# Patient Record
Sex: FEMALE | Race: WHITE | NOT HISPANIC OR LATINO | Employment: STUDENT
[De-identification: names, ages, dates, MRNs, and addresses within clinical notes are randomized per-mention and may not be internally consistent; named-entity substitution may affect disease eponyms.]

---
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
## 2024-09-10 ENCOUNTER — MEDICAL CORRESPONDENCE (OUTPATIENT)
Dept: HEALTH INFORMATION MANAGEMENT | Facility: CLINIC | Age: 19
End: 2024-09-10

## 2024-09-10 ENCOUNTER — TRANSFERRED RECORDS (OUTPATIENT)
Dept: HEALTH INFORMATION MANAGEMENT | Facility: CLINIC | Age: 19
End: 2024-09-10

## 2024-09-12 ENCOUNTER — MEDICAL CORRESPONDENCE (OUTPATIENT)
Dept: HEALTH INFORMATION MANAGEMENT | Facility: CLINIC | Age: 19
End: 2024-09-12

## 2024-09-12 ENCOUNTER — TRANSCRIBE ORDERS (OUTPATIENT)
Dept: OTHER | Age: 19
End: 2024-09-12

## 2024-09-12 DIAGNOSIS — G93.5 CHIARI MALFORMATION TYPE I (H): Primary | ICD-10-CM

## 2024-09-12 DIAGNOSIS — M54.14 THORACIC RADICULOPATHY: ICD-10-CM

## 2024-09-12 DIAGNOSIS — G89.29 CHRONIC PAIN: ICD-10-CM

## 2024-09-13 ENCOUNTER — TRANSCRIBE ORDERS (OUTPATIENT)
Dept: UROLOGY | Facility: CLINIC | Age: 19
End: 2024-09-13

## 2024-09-13 ENCOUNTER — TELEPHONE (OUTPATIENT)
Dept: UROLOGY | Facility: CLINIC | Age: 19
End: 2024-09-13
Payer: COMMERCIAL

## 2024-09-13 NOTE — TELEPHONE ENCOUNTER
Health Call Center    Phone Message    May a detailed message be left on voicemail: yes     Reason for Call: Appointment Intake/ Referral received  Referring Provider Name: Dr Sonny Rowe @ [facility]  Diagnosis and/or Symptoms: Chiari malformation type I (H) [G93.5] Thoracic radiculopathy [M54.14] Chronic pain [G89.29]   My Clinical Question Is: 2nd opinion- possible Neurogenic bladder    Patient also mentioned that Dr. Rowe was emailing with Dr. Keene as well.    Writer routing to clinic to triage and assist patient. Specifically referred to Dr. Keene. No upcoming availability/template with visit type New Neurogenic Bladder.     Action Taken: Message routed to:  [facility]    Travel Screening: Not Applicable     Date of Service:             No need to reply to sender of message

## 2024-09-25 ENCOUNTER — PRE VISIT (OUTPATIENT)
Dept: UROLOGY | Facility: CLINIC | Age: 19
End: 2024-09-25
Payer: COMMERCIAL

## 2024-09-25 NOTE — TELEPHONE ENCOUNTER
MEDICAL RECORDS REQUEST   [facility]  Urology Clinic  [address] [place] [address]  PHONE: [phone]  Fax: [phone]        FUTURE VISIT INFORMATION                                                   Lydia Barroso, : 2005 scheduled for future visit at [facility]    APPOINTMENT INFORMATION:  Date: 10/03/2024  Provider:  Charity Keene MD  Reason for Visit/Diagnosis: neurogenic bladder    REFERRAL INFORMATION:  Referring provider:  Dr Sonny Rowe @ [facility]      RECORDS REQUESTED FOR VISIT                                                     NOTES  STATUS/DETAILS   OFFICE NOTE from referring provider Media tab Yes, 09/10/2024 -- Sonny [facility]e MD [facility]   OFFICE NOTE from other specialist  yes, 2024 -- Néstor Caputo MD @ [facility]     2024 -- Elli Agarwal PA-C @ [facility]  more   MEDICATION LIST  yes   NEUROGENIC BLADDER     IMAGES PACS yes, ESSENTIA  10/23/2019 -- US ABD   LABS (CMP, RENAL PANEL, CBC)  yes     PRE-VISIT CHECKLIST      Joint diagnostic appointment coordinated correctly          (ensure right order & amount of time) Yes   RECORD COLLECTION COMPLETE Yes

## 2024-09-25 NOTE — TELEPHONE ENCOUNTER
Reason for visit: New neurogenic bladder     Relevant information: 2nd opinion possible neurogenic bladder. Last seen by [facility] urology. History of Chiari I malformation.     Records/imaging/labs/orders: All records available    Pt called: No need for a call    At Rooming: Have patient empty their bladder and PVR. Get a urine sample and dip the urine if they have UTI symptoms.    Radhika Motley  9/25/2024  10:58 AM  
Authored by Resident/PA/NP

## 2024-10-03 ENCOUNTER — OFFICE VISIT (OUTPATIENT)
Dept: UROLOGY | Facility: CLINIC | Age: 19
End: 2024-10-03
Payer: COMMERCIAL

## 2024-10-03 ENCOUNTER — ANCILLARY PROCEDURE (OUTPATIENT)
Dept: ULTRASOUND IMAGING | Facility: CLINIC | Age: 19
End: 2024-10-03
Attending: UROLOGY
Payer: COMMERCIAL

## 2024-10-03 ENCOUNTER — PRE VISIT (OUTPATIENT)
Dept: UROLOGY | Facility: CLINIC | Age: 19
End: 2024-10-03

## 2024-10-03 VITALS
DIASTOLIC BLOOD PRESSURE: 73 MMHG | WEIGHT: 152 LBS | OXYGEN SATURATION: 100 % | SYSTOLIC BLOOD PRESSURE: 119 MMHG | HEIGHT: 69 IN | HEART RATE: 65 BPM | BODY MASS INDEX: 22.51 KG/M2

## 2024-10-03 DIAGNOSIS — N31.9 NEUROGENIC BLADDER: ICD-10-CM

## 2024-10-03 DIAGNOSIS — N39.8 VOIDING DYSFUNCTION: ICD-10-CM

## 2024-10-03 DIAGNOSIS — G89.29 OTHER CHRONIC PAIN: ICD-10-CM

## 2024-10-03 DIAGNOSIS — G93.5 CHIARI MALFORMATION TYPE I (H): ICD-10-CM

## 2024-10-03 DIAGNOSIS — M54.14 THORACIC RADICULOPATHY: ICD-10-CM

## 2024-10-03 DIAGNOSIS — N39.8 VOIDING DYSFUNCTION: Primary | ICD-10-CM

## 2024-10-03 PROCEDURE — 99204 OFFICE O/P NEW MOD 45 MIN: CPT | Mod: 25 | Performed by: UROLOGY

## 2024-10-03 PROCEDURE — 51798 US URINE CAPACITY MEASURE: CPT | Performed by: UROLOGY

## 2024-10-03 PROCEDURE — 76770 US EXAM ABDO BACK WALL COMP: CPT | Mod: GC | Performed by: RADIOLOGY

## 2024-10-03 RX ORDER — HYDROXYZINE HYDROCHLORIDE 25 MG/1
25 TABLET, FILM COATED ORAL
COMMUNITY
Start: 2022-12-19

## 2024-10-03 RX ORDER — ESCITALOPRAM OXALATE 20 MG/1
20 TABLET ORAL DAILY
COMMUNITY

## 2024-10-03 RX ORDER — TRETINOIN 0.5 MG/G
CREAM TOPICAL AT BEDTIME
COMMUNITY
Start: 2024-06-12

## 2024-10-03 RX ORDER — AZELAIC ACID 0.15 G/G
GEL TOPICAL 2 TIMES DAILY
COMMUNITY
Start: 2024-01-02

## 2024-10-03 RX ORDER — ETONOGESTREL AND ETHINYL ESTRADIOL .015; .12 MG/D; MG/D
1 INSERT, EXTENDED RELEASE VAGINAL
COMMUNITY
Start: 2024-08-16

## 2024-10-03 ASSESSMENT — PAIN SCALES - GENERAL: PAINLEVEL: NO PAIN (0)

## 2024-10-03 NOTE — PROGRESS NOTES
October 3, 2024    Referring Provider: Sonny Rowe MD  [facility]  [address]  [place] [address]    Primary Care Provider: Derik Sutherland    Assessment & Plan     Chiari malformation type I (H)/Thoracic radiculopathy  She follows with neurology for this and is stable after her surgery  - Adult Urology  Referral  - US Renal Complete Non-Vascular; Future    Other chronic pain  She is working with Dr Koo for her pain and lack of sensation  - Adult Urology  Referral  - US Renal Complete Non-Vascular; Future    Voiding dysfunction  Discussed that I would recommend continued timed voiding at this time.  Unclear the etiology for her symptoms  - US Renal Complete Non-Vascular; Future  - Routine UA with microscopic - No culture; Future  - Urine Culture Aerobic Bacterial; Future    Neurogenic bladder  Discussed that this is a term that just denotes her urinary symptoms are likely contributed by her prior neurologic issues.  Renal US ordered to ensure no hydronephrosis and I do not see any recent renal US  - US Renal Complete Non-Vascular; Future  - Routine UA with microscopic - No culture; Future  - Urine Culture Aerobic Bacterial; Future    We had a discussion that at this time there is no way to figure out why her bladder is this way, we can consider her prior neurologic issues play a role but no way to prove it.  Discussed at this time I am reassured that she is not having gross hematuria, UTIs or pain.      I have recommended some testing to assess that her bladder is safe (renal US to evaluate for hydro) and VUDS to assess bladder function and compliance at this time.  Given that she has altered sensation I would also like her to do a UA/UC about a week before her VUDS to evaluate for any bacteriuria    30 minutes were spent on this day of the encounter in reviewing the EMR including reviewing Dr Rowe' note and Dr Buchanan's note,  direct patient care including ordering renal US, coordination of care including discussion with referring MD, and documentation    Charity Keene MD MPH  (she/her/hers)   of Urology  [facility]      HPI:  Lydia Barroso is a 19 year old female who presents for evaluation of her pelvic floor symptoms.  She is here today with her mother.  Patient is referred here by Dr Rowe who is one of her physicians at [facility], note from 9/16/24 reviewed in [facility].  Patient has a history of Chiari formation with syrinx noted after she was having some peripheral nerve issues with using her extremities.  She underwent successful surgery for this in 2019.  Since then she has had altered sensation for many things.  She follows with neurology as well.  She is unsure when her urinary issues started but started noting that she was urinating less frequently and things were different earlier this year, underwent both brain and spine MRIs with her neurologist which she reports were unremarkable.      She states that on a good day that she urinate 3x on a bad 1x day, just doesn't necessarily feel any urgency.  Mother notes however patient can have really bad menstrual cramps.    Saw Dr Néstor Buchanan in [place] 9/9/24, note reviewed in [facility].  He told her to do timed voiding.  She and her mother are here today for another opinion    Patient denies gross hematuria, UTIs, urinary incontinence.  She has not had any issues with urinary retention    No past medical history on file.    No past surgical history on file.    Social History     Socioeconomic History    Marital status: Single     Spouse name: Not on file    Number of children: Not on file    Years of education: Not on file    Highest education level: Not on file   Occupational History    Not on file   Tobacco Use    Smoking status: Never    Smokeless tobacco: Not on file   Substance and Sexual Activity    Alcohol use: Not on file     Drug use: Not on file    Sexual activity: Not on file   Other Topics Concern    Not on file   Social History Narrative    The patient lives with Mother, Father, and 2 sisters    School Name: [employer]    Grade: Second (2012)             Social Determinants of Health     Financial Resource Strain: Low Risk  (2/5/2024)    Received from [employer] [facility] [employer] [facility]    Overall Financial Resource Strain (CARDIA)     Difficulty of Paying Living Expenses: Not hard at all   Food Insecurity: No Food Insecurity (3/5/2024)    [facility] [employer] [facility] [employer] [facility]    Hunger Vital Sign     Worried About Running Out of Food in the Last Year: Never true     Ran Out of Food in the Last Year: Never true   Transportation Needs: No Transportation Needs (3/5/2024)    [facility] [employer] [facility] [employer] [facility]    PRAPARE - Transportation     Lack of Transportation (Medical): No     Lack of Transportation (Non-Medical): No   Physical Activity: Sufficiently Active (2/5/2024)    Received from [employer] [facility] [employer] [facility]    Exercise Vital Sign     Days of Exercise per Week: 6 days     Minutes of Exercise per Session: 60 min   Stress: No Stress Concern Present (2/5/2024)    Received from [employer] [facility]    [place] of Occupational Health - Occupational Stress Questionnaire     Feeling of Stress : Not at all   Social Connections: Unknown (2/5/2024)    Received from [employer] [facility]    Social Connection and Isolation Panel [NHANES]     Frequency of Communication with Friends and Family: More than three times a week     Frequency of Social Gatherings with Friends and Family: More than three times a week     Attends Taoism Services: Never     Active Member of Clubs or Organizations: No     Attends Club or Organization Meetings: Never     Marital Status: Patient declined   Interpersonal  "Safety: Patient Declined (2/5/2024)    Received from [facility]    Humiliation, Afraid, Rape, and Kick questionnaire     Fear of Current or Ex-Partner: Patient declined     Emotionally Abused: Patient declined     Physically Abused: Patient declined     Sexually Abused: Patient declined   Housing Stability: Low Risk  (3/5/2024)    Received from [facility]     IP Housing Domain     Retired - What is your living situation today? : I have a steady place to live     Family History   Problem Relation Age of Onset    Diabetes No family hx of     Asthma No family hx of     Hypertension No family hx of     Thyroid Disease No family hx of      ROS    Allergies   Allergen Reactions    No Known Drug Allergy      Current Outpatient Medications   Medication Sig Dispense Refill    azelaic acid (FINACIA) 15 % external gel Apply topically 2 times daily.      HALOETTE 0.12-0.015 MG/24HR vaginal ring Place 1 each vaginally every 28 days.      hydrOXYzine HCl (ATARAX) 25 MG tablet Take 25 mg by mouth.      tretinoin (RETIN-A) 0.05 % external cream Apply topically at bedtime.      amoxicillin-clavulanate (AUGMENTIN) 875-125 MG per tablet Take 1 tablet by mouth 2 times daily 20 tablet 0    escitalopram (LEXAPRO) 20 MG tablet Take 20 mg by mouth daily.       No current facility-administered medications for this visit.     /73   Pulse 65   Ht 1.753 m (5' 9\")   Wt 68.9 kg (152 lb)   SpO2 100%   BMI 22.45 kg/m    GENERAL: healthy, alert and no distress  EYES: Eyes grossly normal to inspection, conjunctivae and sclerae normal  HENT: normal cephalic/atraumatic.  External ears, nose and mouth without ulcers or lesions.  RESP: no audible wheeze, cough, or visible cyanosis.  No visible retractions or increased work of breathing.  Able to speak fully in complete sentences.  NEURO: Cranial nerves grossly intact, mentation intact and speech normal  PSYCH: mentation " appears normal, affect normal/bright, judgement and insight intact, normal speech and appearance well-groomed    PVR 0 mL by bladder scan    CC  Patient Care Team:  Derik Sutherland as PCP - General (Family Medicine)  FRANCIS BARON

## 2024-10-03 NOTE — NURSING NOTE
"Chief Complaint   Patient presents with    Neurogenic Bladder       Blood pressure 119/73, pulse 65, height 1.753 m (5' 9\"), weight 68.9 kg (152 lb), SpO2 100%. Body mass index is 22.45 kg/m .    There is no problem list on file for this patient.      Allergies   Allergen Reactions    No Known Drug Allergy        Current Outpatient Medications   Medication Sig Dispense Refill    azelaic acid (FINACIA) 15 % external gel Apply topically 2 times daily.      HALOETTE 0.12-0.015 MG/24HR vaginal ring Place 1 each vaginally every 28 days.      hydrOXYzine HCl (ATARAX) 25 MG tablet Take 25 mg by mouth.      tretinoin (RETIN-A) 0.05 % external cream Apply topically at bedtime.      amoxicillin-clavulanate (AUGMENTIN) 875-125 MG per tablet Take 1 tablet by mouth 2 times daily 20 tablet 0    escitalopram (LEXAPRO) 20 MG tablet Take 20 mg by mouth daily.         Social History     Tobacco Use    Smoking status: Haider Motley  10/3/2024  1:23 PM     "

## 2024-10-03 NOTE — LETTER
10/3/2024       RE: Lydia Barroso  [address]  [place] [place] [facility] [address]     Dear Colleague,    Thank you for referring your patient, Lydia Barroso, to the [facility] [place] [facility]. Please see a copy of my visit note below.    October 3, 2024    Referring Provider: Sonny Rowe MD  [facility] PAIN PALLIATIVE [facility]  [address]  [place] [address]    Primary Care Provider: Derik Sutherland    Assessment & Plan    Chiari malformation type I (H)/Thoracic radiculopathy  She follows with neurology for this and is stable after her surgery  - Adult Urology  Referral  - US Renal Complete Non-Vascular; Future    Other chronic pain  She is working with Dr Koo for her pain and lack of sensation  - Adult Urology  Referral  - US Renal Complete Non-Vascular; Future    Voiding dysfunction  Discussed that I would recommend continued timed voiding at this time.  Unclear the etiology for her symptoms  - US Renal Complete Non-Vascular; Future  - Routine UA with microscopic - No culture; Future  - Urine Culture Aerobic Bacterial; Future    Neurogenic bladder  Discussed that this is a term that just denotes her urinary symptoms are likely contributed by her prior neurologic issues.  Renal US ordered to ensure no hydronephrosis and I do not see any recent renal US  - US Renal Complete Non-Vascular; Future  - Routine UA with microscopic - No culture; Future  - Urine Culture Aerobic Bacterial; Future    We had a discussion that at this time there is no way to figure out why her bladder is this way, we can consider her prior neurologic issues play a role but no way to prove it.  Discussed at this time I am reassured that she is not having gross hematuria, UTIs or pain.      I have recommended some testing to assess that her bladder is safe (renal US to evaluate for hydro) and VUDS to assess bladder  function and compliance at this time.  Given that she has altered sensation I would also like her to do a UA/UC about a week before her VUDS to evaluate for any bacteriuria    30 minutes were spent on this day of the encounter in reviewing the EMR including reviewing Dr Rowe' note and Dr Buchanan's note, direct patient care including ordering renal US, coordination of care including discussion with referring MD, and documentation    Charity Keene MD MPH  (she/her/hers)   of Urology  [facility]      HPI:  Lydia Barroso is a 19 year old female who presents for evaluation of her pelvic floor symptoms.  She is here today with her mother.  Patient is referred here by Dr Rowe who is one of her physicians at [facility], note from 9/16/24 reviewed in [facility].  Patient has a history of Chiari formation with syrinx noted after she was having some peripheral nerve issues with using her extremities.  She underwent successful surgery for this in 2019.  Since then she has had altered sensation for many things.  She follows with neurology as well.  She is unsure when her urinary issues started but started noting that she was urinating less frequently and things were different earlier this year, underwent both brain and spine MRIs with her neurologist which she reports were unremarkable.      She states that on a good day that she urinate 3x on a bad 1x day, just doesn't necessarily feel any urgency.  Mother notes however patient can have really bad menstrual cramps.    Saw Dr Néstor Buchanan in [place] 9/9/24, note reviewed in [facility].  He told her to do timed voiding.  She and her mother are here today for another opinion    Patient denies gross hematuria, UTIs, urinary incontinence.  She has not had any issues with urinary retention    No past medical history on file.    No past surgical history on file.    Social History     Socioeconomic History     Marital status: Single      Spouse name: Not on file     Number of children: Not on file     Years of education: Not on file     Highest education level: Not on file   Occupational History     Not on file   Tobacco Use     Smoking status: Never     Smokeless tobacco: Not on file   Substance and Sexual Activity     Alcohol use: Not on file     Drug use: Not on file     Sexual activity: Not on file   Other Topics Concern     Not on file   Social History Narrative    The patient lives with Mother, Father, and 2 sisters    School Name: [employer]    Grade: Second (2012)             Social Determinants of Health     Financial Resource Strain: Low Risk  (2/5/2024)    Received from [employer] [facility]    Overall Financial Resource Strain (CARDIA)      Difficulty of Paying Living Expenses: Not hard at all   Food Insecurity: No Food Insecurity (3/5/2024)    Received from [employer] [facility]    Hunger Vital Sign      Worried About Running Out of Food in the Last Year: Never true      Ran Out of Food in the Last Year: Never true   Transportation Needs: No Transportation Needs (3/5/2024)    Received from [employer] [facility]    PRAPARE - Transportation      Lack of Transportation (Medical): No      Lack of Transportation (Non-Medical): No   Physical Activity: Sufficiently Active (2/5/2024)    Received from [employer] [facility]    Exercise Vital Sign      Days of Exercise per Week: 6 days      Minutes of Exercise per Session: 60 min   Stress: No Stress Concern Present (2/5/2024)    Received from [employer] [facility]    [place] of Occupational Health - Occupational Stress Questionnaire      Feeling of Stress : Not at all   Social Connections: Unknown (2/5/2024)    Received from [employer] [facility]    Social Connection and Isolation Panel [NHANES]      Frequency of Communication with  "Friends and Family: More than three times a week      Frequency of Social Gatherings with Friends and Family: More than three times a week      Attends Advent Services: Never      Active Member of Clubs or Organizations: No      Attends Club or Organization Meetings: Never      Marital Status: Patient declined   Interpersonal Safety: Patient Declined (2/5/2024)    Received from [facility]    Humiliation, Afraid, Rape, and Kick questionnaire      Fear of Current or Ex-Partner: Patient declined      Emotionally Abused: Patient declined      Physically Abused: Patient declined      Sexually Abused: Patient declined   Housing Stability: Low Risk  (3/5/2024)    Received from [facility] [employer] [facility] IP Housing Domain      Retired - What is your living situation today? : I have a steady place to live     Family History   Problem Relation Age of Onset     Diabetes No family hx of      Asthma No family hx of      Hypertension No family hx of      Thyroid Disease No family hx of      ROS    Allergies   Allergen Reactions     No Known Drug Allergy      Current Outpatient Medications   Medication Sig Dispense Refill     azelaic acid (FINACIA) 15 % external gel Apply topically 2 times daily.       HALOETTE 0.12-0.015 MG/24HR vaginal ring Place 1 each vaginally every 28 days.       hydrOXYzine HCl (ATARAX) 25 MG tablet Take 25 mg by mouth.       tretinoin (RETIN-A) 0.05 % external cream Apply topically at bedtime.       amoxicillin-clavulanate (AUGMENTIN) 875-125 MG per tablet Take 1 tablet by mouth 2 times daily 20 tablet 0     escitalopram (LEXAPRO) 20 MG tablet Take 20 mg by mouth daily.       No current facility-administered medications for this visit.     /73   Pulse 65   Ht 1.753 m (5' 9\")   Wt 68.9 kg (152 lb)   SpO2 100%   BMI 22.45 kg/m    GENERAL: healthy, alert and no distress  EYES: Eyes grossly normal to inspection, conjunctivae and " sclerae normal  HENT: normal cephalic/atraumatic.  External ears, nose and mouth without ulcers or lesions.  RESP: no audible wheeze, cough, or visible cyanosis.  No visible retractions or increased work of breathing.  Able to speak fully in complete sentences.  NEURO: Cranial nerves grossly intact, mentation intact and speech normal  PSYCH: mentation appears normal, affect normal/bright, judgement and insight intact, normal speech and appearance well-groomed    PVR 0 mL by bladder scan    CC  Patient Care Team:  Derik Sutherland as PCP - General (Family Medicine)  FRANCIS BARON                Again, thank you for allowing me to participate in the care of your patient.      Sincerely,    Charity Keene MD

## 2024-10-03 NOTE — PATIENT INSTRUCTIONS
Please schedule a lab only appointment to collect a UA/UC 7-10 days prior to urodynamics appointment.      Websites with free information:    American Urogynec[employer] Society patient website: www.voicesforpfd.org    Total Control Program: www.totalcontrolprogram.com    It was a pleasure meeting with you today.  Thank you for allowing me and my team the privilege of caring for you today.  YOU are the reason we are here, and I truly hope we provided you with the excellent service you deserve.  Please let us know if there is anything else we can do for you so that we can be sure you are leaving completely satisfied with your care experience.

## 2024-10-09 ENCOUNTER — TRANSFERRED RECORDS (OUTPATIENT)
Dept: MULTI SPECIALTY CLINIC | Facility: CLINIC | Age: 19
End: 2024-10-09

## 2024-10-09 LAB — CHLAMYDIA - HIM PATIENT REPORTED: NORMAL

## 2024-10-15 ENCOUNTER — PRE VISIT (OUTPATIENT)
Dept: UROLOGY | Facility: CLINIC | Age: 19
End: 2024-10-15
Payer: COMMERCIAL

## 2024-10-15 NOTE — TELEPHONE ENCOUNTER
Reason for visit: VUDS    Study scheduled because: evaluation for bacteriuria    Relevant information:  voiding dysfunction, neurogenic bladder       Records/imaging/labs/orders: all records available    UA/UC ordered: kareem Weinberg  10/15/2024  8:07 AM

## 2024-10-21 ENCOUNTER — LAB (OUTPATIENT)
Dept: LAB | Facility: CLINIC | Age: 19
End: 2024-10-21
Payer: COMMERCIAL

## 2024-10-21 DIAGNOSIS — N39.8 VOIDING DYSFUNCTION: ICD-10-CM

## 2024-10-21 DIAGNOSIS — N31.9 NEUROGENIC BLADDER: ICD-10-CM

## 2024-10-21 LAB
ALBUMIN UR-MCNC: NEGATIVE MG/DL
APPEARANCE UR: CLEAR
BACTERIA #/AREA URNS HPF: ABNORMAL /HPF
BILIRUB UR QL STRIP: NEGATIVE
COLOR UR AUTO: ABNORMAL
GLUCOSE UR STRIP-MCNC: NEGATIVE MG/DL
HGB UR QL STRIP: NEGATIVE
KETONES UR STRIP-MCNC: NEGATIVE MG/DL
LEUKOCYTE ESTERASE UR QL STRIP: ABNORMAL
NITRATE UR QL: NEGATIVE
PH UR STRIP: 5.5 [PH] (ref 5–7)
RBC URINE: 2 /HPF
SP GR UR STRIP: 1 (ref 1–1.03)
SQUAMOUS EPITHELIAL: 4 /HPF
UROBILINOGEN UR STRIP-MCNC: NORMAL MG/DL
WBC URINE: 11 /HPF

## 2024-10-21 PROCEDURE — 87086 URINE CULTURE/COLONY COUNT: CPT | Performed by: UROLOGY

## 2024-10-21 PROCEDURE — 81001 URINALYSIS AUTO W/SCOPE: CPT | Performed by: PATHOLOGY

## 2024-10-21 PROCEDURE — 99000 SPECIMEN HANDLING OFFICE-LAB: CPT | Performed by: PATHOLOGY

## 2024-10-22 LAB — BACTERIA UR CULT: NORMAL

## 2024-10-28 NOTE — PROGRESS NOTES
PREPROCEDURE DIAGNOSES:    1. Infrequent voiding with lack of urgency  2. History of Chiari malformation with syrinx s/p successful surgery for this in 2019    POSTPROCEDURE DIAGNOSES:  -Large bladder capacity (712 mL) with absent filling sensations until she reaches capacity, at which time she reports a pain in her pinky toe, which is typical for her.  -Good bladder compliance without DO/DOI.  -No incontinence.  -No appreciable rise in detrusor pressure during voiding. She appears to void entirely through Valsalva effort.   -Slow flow rate (Qmax 9.4 mL/s) with a fluctuating, bell-shaped flow curve and incomplete bladder emptying (final  mL). She voids additional volume in toilet with catheters removed.  -Increased EMG activity during voiding, which may correlate with her Valsalva pushing.  -Fluoroscopy reveals a smooth bladder wall without diverticulae or cellules.  No vesicoureteral reflux was observed.  The bladder neck was closed during filling. As she is unable to void on the UDS chair, no voiding imaging obtained.    PROCEDURE:    -Uroflowmetry.  -Sterile urethral catheterization for measurement of postvoid residual urine volume.  -Complex filling cystometrogram with measurement of bladder and rectal pressures.  -Complex voiding cystometrogram with measurement of bladder and rectal pressures.  -Electromyography of the pelvic floor during urodynamics.  -Fluoroscopic imaging of the bladder during urodynamics, at least 3 views.    -Interpretation of urodynamics and flouroscopic imaging.      INDICATIONS FOR PROCEDURE:  Ms. Lydia Barroso is a pleasant 19 year old female who presents for video urodynamic assessment. VUDS is requested today by Dr. Keene to better characterize Ms. Lydia Barroso's voiding dysfunction.      DESCRIPTION OF PROCEDURE:  Risks, benefits, and alternatives to urodynamics were discussed with the patient and she wished to proceed.  Urodynamics are planned to better assess the  primary etiology for Ms. Barroso's urologic dysfunction.  After informed consent was obtained, the patient was taken to the procedure room where the study was initiated. Findings below.     PRE-STUDY UROFLOWMETRY:  Voided volume: 660 mL.  Maximum flow rate: 22.9 mL/sec.  Average flow rate: 12.9 mL/sec.  Character of the curve: Fluctuating bell curve.  Postvoid residual by catheter: 90 mL.  Pretest urine dipstick was negative for leukocytes and nitrites.    Next a 7F double-lumen urodynamics catheter was inserted into the bladder under sterile technique via the urethra.  A 7F abdominal manometry catheter was placed in the rectum.  EMG pads were placed on both sides of the anal verge.  The bladder was filled with 100 mL of Omnipaque at 30 mL/minute and serial pressures were recorded.  With coughing there was an appropriate rise in vesical and abdominal pressures with no change in detrusor pressure, confirming good study catheter placement.    DURING THE FILLING PHASE:  Sensations absent until she reaches capacity, at which she reports a pain in her pinky toe, which is typical for her.   Maximum Capacity: 712 mL.    Uninhibited detrusor contractions: None.  Compliance: Good. PDet=0 cmH20 at capacity.  Continence: No DOI or NÉSTOR.  EMG: Concordant during filling.    DURING THE VOIDING PHASE:  She initially attempts to void on the UDS chair but is unable. She is therefore transferred down to a commode for a second attempt, which yielded the following data:     Maximum detrusor contraction with void: No appreciable rise in detrusor pressure during voiding. She appears to void entirely through Valsalva effort.   Voided volume: 316 mL.  Maximum flow rate: 9.4 mL/sec.  Average flow rate: 5.1 mL/sec.  Postvoid Residual: 397 mL.  EMG activity: Increased, which may correlate with Valsalva effort.  Character of voiding curve: Fluctuating bell curve.  BOOI: -31.4 (suggesting no obstruction - see key below)  [obstructed (PACE index  [BOOI] >= 40); equivocal (no definite   obstruction; BOOI 20-40); and no obstruction (BOOI <= 20)]    She voids additional volume in toilet with catheters removed.    FLUOROSCOPIC IMAGING OF THE BLADDER DURING URODYNAMICS:  Please note, image numbers on UDS tracings correlate with iSite series numbers on PACS images. Fluoroscopy during today's procedure demonstrated a smooth bladder wall without diverticulae or cellules.  No vesicoureteral reflux was observed.  The bladder neck was closed during filling. As she is unable to void on the UDS chair, no voiding imaging obtained.  At the completion of the study, all catheters were removed and the patient was brought back into the consultation room to further discuss today's study results.      ASSESSMENT/PLAN:  Ms. Lydia Barroso is a pleasant 19 year old female who demonstrated the following findings today on urodynamic evaluation:    -Large bladder capacity (712 mL) with absent filling sensations until she reaches capacity, at which time she reports a pain in her pinky toe, which is typical for her.  -Good bladder compliance without DO/DOI.  -No incontinence.  -No appreciable rise in detrusor pressure during voiding. She appears to void entirely through Valsalva effort.   -Slow flow rate (Qmax 9.4 mL/s) with a fluctuating, bell-shaped flow curve and incomplete bladder emptying (final  mL). She voids additional volume in toilet with catheters removed.  -Increased EMG activity during voiding, which may correlate with her Valsalva pushing.  -Fluoroscopy reveals a smooth bladder wall without diverticulae or cellules.  No vesicoureteral reflux was observed.  The bladder neck was closed during filling. As she is unable to void on the UDS chair, no voiding imaging obtained.    The patient will follow up as scheduled with Dr. Keene to further discuss today's study results and make plans for how best to proceed.      - A single Bactrim was provided for UTI prophylaxis  following completion of today's study per department protocol.  The risk of UTI with VUDS is low at ~2.5-3%.      Thank you for allowing me to participate in the care of Ms. Lydia Barroso and please don't hesitate to contact me with any questions or concerns.      This procedure was performed under a collaborative agreement with Dr. Sharif Burris, Professor and  of Urology, [facility].    CYN Cano, CNP  Department of Urology

## 2024-10-30 ENCOUNTER — ALLIED HEALTH/NURSE VISIT (OUTPATIENT)
Dept: UROLOGY | Facility: CLINIC | Age: 19
End: 2024-10-30
Payer: COMMERCIAL

## 2024-10-30 ENCOUNTER — ANCILLARY PROCEDURE (OUTPATIENT)
Dept: RADIOLOGY | Facility: AMBULATORY SURGERY CENTER | Age: 19
End: 2024-10-30
Attending: NURSE PRACTITIONER
Payer: COMMERCIAL

## 2024-10-30 VITALS
DIASTOLIC BLOOD PRESSURE: 83 MMHG | SYSTOLIC BLOOD PRESSURE: 122 MMHG | WEIGHT: 155 LBS | HEIGHT: 70 IN | OXYGEN SATURATION: 98 % | BODY MASS INDEX: 22.19 KG/M2 | HEART RATE: 65 BPM

## 2024-10-30 DIAGNOSIS — N39.8 VOIDING DYSFUNCTION: Primary | ICD-10-CM

## 2024-10-30 DIAGNOSIS — R39.89 URINARY PROBLEM: ICD-10-CM

## 2024-10-30 LAB
ALBUMIN UR-MCNC: NEGATIVE MG/DL
APPEARANCE UR: CLEAR
BILIRUB UR QL STRIP: NEGATIVE
COLOR UR AUTO: YELLOW
GLUCOSE UR STRIP-MCNC: NEGATIVE MG/DL
HGB UR QL STRIP: NEGATIVE
KETONES UR STRIP-MCNC: NEGATIVE MG/DL
LEUKOCYTE ESTERASE UR QL STRIP: NEGATIVE
NITRATE UR QL: NEGATIVE
PH UR STRIP: 6.5 [PH] (ref 5–8)
SP GR UR STRIP: <=1.005 (ref 1–1.03)
UROBILINOGEN UR STRIP-ACNC: 0.2 E.U./DL

## 2024-10-30 PROCEDURE — 74430 CONTRAST X-RAY BLADDER: CPT | Performed by: NURSE PRACTITIONER

## 2024-10-30 PROCEDURE — 81003 URINALYSIS AUTO W/O SCOPE: CPT | Performed by: PATHOLOGY

## 2024-10-30 PROCEDURE — 51784 ANAL/URINARY MUSCLE STUDY: CPT | Performed by: NURSE PRACTITIONER

## 2024-10-30 PROCEDURE — 51797 INTRAABDOMINAL PRESSURE TEST: CPT | Performed by: NURSE PRACTITIONER

## 2024-10-30 PROCEDURE — 51600 INJECTION FOR BLADDER X-RAY: CPT | Performed by: NURSE PRACTITIONER

## 2024-10-30 PROCEDURE — 51728 CYSTOMETROGRAM W/VP: CPT | Performed by: NURSE PRACTITIONER

## 2024-10-30 PROCEDURE — 51741 ELECTRO-UROFLOWMETRY FIRST: CPT | Performed by: NURSE PRACTITIONER

## 2024-10-30 ASSESSMENT — PAIN SCALES - GENERAL: PAINLEVEL_OUTOF10: EXTREME PAIN (8)

## 2024-10-30 NOTE — NURSING NOTE
"  Chief Complaint   Patient presents with    Urodynamics Study       Blood pressure 122/83, pulse 65, height 1.778 m (5' 10\"), weight 70.3 kg (155 lb), SpO2 98%. Body mass index is 22.24 kg/m .    There is no problem list on file for this patient.      Allergies   Allergen Reactions    No Known Drug Allergy        Current Outpatient Medications   Medication Sig Dispense Refill    azelaic acid (FINACIA) 15 % external gel Apply topically 2 times daily.      escitalopram (LEXAPRO) 20 MG tablet Take 20 mg by mouth daily.      HALOETTE 0.12-0.015 MG/24HR vaginal ring Place 1 each vaginally every 28 days.      hydrOXYzine HCl (ATARAX) 25 MG tablet Take 25 mg by mouth.      tretinoin (RETIN-A) 0.05 % external cream Apply topically at bedtime.      amoxicillin-clavulanate (AUGMENTIN) 875-125 MG per tablet Take 1 tablet by mouth 2 times daily 20 tablet 0       Social History     Tobacco Use    Smoking status: Never       Invasive Procedure Safety Checklist:    Procedure: Urodynamics    Action: Complete sections and checkboxes as appropriate.  Pre-procedure:  1. Patient ID Verified with 2 identifiers (Tamara and  or MRN) : YES    2. Procedure and site verified with patient/designee (when able) : YES    3. Accurate consent documentation in medical record : YES    4. H&P (or appropriate assessment) documented in medical record : N/A  H&P must be up to 30 days prior to procedure an updated within 24 hours of Procedure as applicable.     5. Relevant diagnostic and radiology test results appropriately labeled and displayed as applicable : YES    6. Blood products, implants, devices, and/or special equipment available for the procedure as applicable : YES    7. Procedure site(s) marked with provider initials [Exclusions: none] : NO    8. Marking not required. Reason : Yes  Procedure does not require site marking    Time Out:     Time-Out performed immediately prior to starting procedure, including verbal and active participation " of all team members addressing: YES    1. Correct patient identity.  2. Confirmed that the correct side and site are marked.  3. An accurate procedure to be done.  4. Agreement on the procedure to be done.  5. Correct patient position.  6. Relevant images and results are properly labeled and appropriately displayed.  7. The need to administer antibiotics or fluids for irrigation purposes during the procedure as applicable.  8. Safety precautions based on patient history or medication use.    During Procedure: Verification of correct person, site, and procedure occurs any time the responsibility for care of the patient is transferred to another member of the care team.      The following medication was given:     MEDICATION: Bactrim (Trimethoprim / Sulfamethoxazole)  ROUTE: PO  SITE: Medication was given orally  DOSE: 800mg/160mg  LOT #: [id number]  : Animated Speech   EXPIRATION DATE: 05/31/2026  NDC#: 8726-0122-20   Was there drug waste? No    Prior to medication administration, verified patient identity using patient's name and date of birth.  Due to medication administration, patient instructed to remain in clinic for 15 minutes  afterwards, and to report any adverse reaction to me immediately.   Prior to administration, verified patient identity using patient's name and date of birth.  Due to administration, patient instructed to remain in clinic for 15 minutes  afterwards, and to report any adverse reaction to me immediately.    Drug Amount Wasted:  None.  Vial/Syringe: Single dose vial    The following medication was given:     MEDICATION:  Omnipaque (Iohexol Injection) (240mgI/mL)  ROUTE: Provider Administered  SITE: Provider Administered via catheter  DOSE: 100mL  LOT #: [id number]  : [employer]apt[employer]  EXPIRATION DATE: 04/23/2026  NDC#: 6424-1651-85   Was there drug waste? No    Prior to injection, verified patient identity using patient's name and date of birth.  Due to injection  administration, patient instructed to remain in clinic for 15 minutes  afterwards, and to report any adverse reaction to me immediately.    Drug Amount Wasted:  None.  Vial/Syringe: Single dose vial      The following medication was given: See Above    Insertion:  14 Fr straight tipped silicone straight catheter inserted into urethral meatus in the usual sterile fashion without difficulty.  Received 85 ml yellow urine output.     Patient did tolerate procedure well.    Patient instructed as to where to call or go for pain, fever, leakage, or decreased urine flow.     This service provided today was under the direct supervision of Neena Rowe DNP, who was available if needed.    Kamryn Ovalle LPN  10/30/2024  3:24 PM

## 2024-10-30 NOTE — PATIENT INSTRUCTIONS
UROLOGY CLINIC VISIT PATIENT INSTRUCTIONS    Schedule follow up visit with Dr. Keene.    If you have any issues, questions or concerns in the meantime, do not hesitate to contact us at [phone] or via [employer].     Neena Rowe, CNP  Department of Urology

## 2024-10-31 ENCOUNTER — MYC MEDICAL ADVICE (OUTPATIENT)
Dept: UROLOGY | Facility: CLINIC | Age: 19
End: 2024-10-31
Payer: COMMERCIAL

## 2024-11-24 ENCOUNTER — HEALTH MAINTENANCE LETTER (OUTPATIENT)
Age: 19
End: 2024-11-24

## 2024-12-04 ENCOUNTER — PRE VISIT (OUTPATIENT)
Dept: UROLOGY | Facility: CLINIC | Age: 19
End: 2024-12-04
Payer: COMMERCIAL

## 2024-12-04 NOTE — TELEPHONE ENCOUNTER
Reason for visit: UDS follow-up      Relevant information: Hx of Chiari malformation type I (H)/Thoracic radiculopathy, Other chronic pain, Voiding dysfunction, Neurogenic bladder    Records/imaging/labs/orders: all records available    Pt called: no need for a call    At Rooming: Artur Motley  12/4/2024  2:34 PM

## 2024-12-12 ENCOUNTER — OFFICE VISIT (OUTPATIENT)
Dept: UROLOGY | Facility: CLINIC | Age: 19
End: 2024-12-12
Payer: COMMERCIAL

## 2024-12-12 VITALS
HEIGHT: 70 IN | WEIGHT: 155 LBS | SYSTOLIC BLOOD PRESSURE: 119 MMHG | OXYGEN SATURATION: 100 % | DIASTOLIC BLOOD PRESSURE: 77 MMHG | HEART RATE: 66 BPM | BODY MASS INDEX: 22.19 KG/M2

## 2024-12-12 DIAGNOSIS — G93.5 CHIARI MALFORMATION TYPE I (H): ICD-10-CM

## 2024-12-12 DIAGNOSIS — N39.8 VOIDING DYSFUNCTION: Primary | ICD-10-CM

## 2024-12-12 DIAGNOSIS — N31.9 NEUROGENIC BLADDER: ICD-10-CM

## 2024-12-12 ASSESSMENT — PAIN SCALES - GENERAL: PAINLEVEL_OUTOF10: NO PAIN (0)

## 2024-12-12 NOTE — PROGRESS NOTES
"December 12, 2024    Lydia was seen today for follow up.    Diagnoses and all orders for this visit:    Voiding dysfunction    Chiari malformation type I (H)    Neurogenic bladder    Recommend timed voiding but more to try to see if she can decrease the time between voids    Continue to stay well hydrated    Return sooner if pain, UTIs, gross hematuria otherwise will plan to see her back in 1 year     20 minutes were spent today on the day of the encounter in reviewing the EMR including renal US results and interpretation of the VUDS, direct patient care, coordination of care and documentation    Charity Keene MD MPH  (she/her/hers)   of Urology  [facility]    Subjective    Here today with mom for follow up    States that she usually doesn't see her big toe hurt unless she really has to go    84 oz water a day plus diet coke and some other beverages.  Only urinates about 2x a day    She denies any changes in health since last visit    /77 (BP Location: Right arm, Patient Position: Sitting, Cuff Size: Adult Regular)   Pulse 66   Ht 1.778 m (5' 10\")   Wt 70.3 kg (155 lb)   SpO2 100%   BMI 22.24 kg/m    GENERAL: healthy, alert and no distress  EYES: Eyes grossly normal to inspection, conjunctivae and sclerae normal  HENT: normal cephalic/atraumatic.  External ears, nose and mouth without ulcers or lesions.  RESP: no audible wheeze, cough, or visible cyanosis.  No visible retractions or increased work of breathing.  Able to speak fully in complete sentences.  NEURO: Cranial nerves grossly intact, mentation intact and speech normal  PSYCH: mentation appears normal, affect normal/bright, judgement and insight intact, normal speech and appearance well-groomed    UDS reviewed by me and on my interpretation she has a USP ~712 mL with normal compliance. No DO/DOI/USI.  Minimal detrusor function and able to empty with valsalva.    Renal US unremarkable, no " hydronephrosis.    CC  Patient Care Team:  Derik Sutherland MD as PCP - General (Family Medicine)  Charity Keene MD as Assigned Surgical Provider  SELF, REFERRED

## 2024-12-12 NOTE — NURSING NOTE
"Chief Complaint   Patient presents with    Follow Up     UDS follow up       Blood pressure 119/77, pulse 66, height 1.778 m (5' 10\"), weight 70.3 kg (155 lb), SpO2 100%. Body mass index is 22.24 kg/m .    There is no problem list on file for this patient.      Allergies   Allergen Reactions    No Known Drug Allergy        Current Outpatient Medications   Medication Sig Dispense Refill    amoxicillin-clavulanate (AUGMENTIN) 875-125 MG per tablet Take 1 tablet by mouth 2 times daily 20 tablet 0    azelaic acid (FINACIA) 15 % external gel Apply topically 2 times daily.      escitalopram (LEXAPRO) 20 MG tablet Take 20 mg by mouth daily.      HALOETTE 0.12-0.015 MG/24HR vaginal ring Place 1 each vaginally every 28 days.      hydrOXYzine HCl (ATARAX) 25 MG tablet Take 25 mg by mouth.      tretinoin (RETIN-A) 0.05 % external cream Apply topically at bedtime.         Social History     Tobacco Use    Smoking status: Never       Derik Lin, [facility]  12/12/2024  2:29 PM     "

## 2024-12-12 NOTE — LETTER
"12/12/2024       RE: Lydia Barroso  [address] [facility] [place] [address]     Dear Colleague,    Thank you for referring your patient, Lydia Barroso, to the [facility] [place] at [facility]. Please see a copy of my visit note below.    December 12, 2024    Lydia was seen today for follow up.    Diagnoses and all orders for this visit:    Voiding dysfunction    Chiari malformation type I (H)    Neurogenic bladder    Recommend timed voiding but more to try to see if she can decrease the time between voids    Continue to stay well hydrated    Return sooner if pain, UTIs, gross hematuria otherwise will plan to see her back in 1 year     20 minutes were spent today on the day of the encounter in reviewing the EMR including renal US results and interpretation of the VUDS, direct patient care, coordination of care and documentation    Charity Keene MD MPH  (she/her/hers)   of Urology  [facility]    Subjective    Here today with mom for follow up    States that she usually doesn't see her big toe hurt unless she really has to go    84 oz water a day plus diet coke and some other beverages.  Only urinates about 2x a day    She denies any changes in health since last visit    /77 (BP Location: Right arm, Patient Position: Sitting, Cuff Size: Adult Regular)   Pulse 66   Ht 1.778 m (5' 10\")   Wt 70.3 kg (155 lb)   SpO2 100%   BMI 22.24 kg/m    GENERAL: healthy, alert and no distress  EYES: Eyes grossly normal to inspection, conjunctivae and sclerae normal  HENT: normal cephalic/atraumatic.  External ears, nose and mouth without ulcers or lesions.  RESP: no audible wheeze, cough, or visible cyanosis.  No visible retractions or increased work of breathing.  Able to speak fully in complete sentences.  NEURO: Cranial nerves grossly intact, mentation intact and speech normal  PSYCH: mentation appears normal, affect " normal/bright, judgement and insight intact, normal speech and appearance well-groomed    UDS reviewed by me and on my interpretation she has a senior living ~712 mL with normal compliance. No DO/DOI/USI.  Minimal detrusor function and able to empty with valsalva.    Renal US unremarkable, no hydronephrosis.    CC  Patient Care Team:  Derik Sutehrland MD as PCP - General (Family Medicine)  Charity Keene MD as Assigned Surgical Provider  SELF, REFERRED        Again, thank you for allowing me to participate in the care of your patient.      Sincerely,    Charity Keene MD

## 2025-03-11 PROBLEM — R20.8 ALLODYNIA: Status: ACTIVE | Noted: 2025-03-11

## 2025-03-11 PROBLEM — G54.9 NERVE ROOT DISORDER: Status: ACTIVE | Noted: 2025-03-11

## 2025-03-11 PROBLEM — G93.5 ARNOLD-CHIARI MALFORMATION, TYPE I (H): Status: ACTIVE | Noted: 2020-01-09

## 2025-03-11 PROBLEM — E88.89 CYP2C9 INTERMEDIATE METABOLIZER (H): Status: ACTIVE | Noted: 2025-03-11

## 2025-03-11 PROBLEM — G96.9 DISORDER OF CENTRAL NERVOUS SYSTEM: Status: ACTIVE | Noted: 2025-03-11

## 2025-03-11 PROBLEM — M79.2 NEUROPATHIC PAIN: Status: ACTIVE | Noted: 2021-04-30

## 2025-03-11 PROBLEM — G89.29 CHRONIC PAIN: Status: ACTIVE | Noted: 2025-03-11

## 2025-03-11 PROBLEM — J98.4 LUNG CYST: Status: ACTIVE | Noted: 2022-01-26

## 2025-03-11 PROBLEM — G95.0 SYRINX OF SPINAL CORD (H): Status: ACTIVE | Noted: 2021-08-02

## 2025-03-11 NOTE — PROGRESS NOTES
Assessment & Plan     1. Generalized anxiety disorder (Primary)  Continue lexapro  Start - busPIRone (BUSPAR) 10 MG tablet; Take 1 tablet (10 mg) by mouth 2 times daily.  Dispense: 60 tablet; Refill: 1    2. Disorder of central nervous system  - busPIRone (BUSPAR) 10 MG tablet; Take 1 tablet (10 mg) by mouth 2 times daily.  Dispense: 60 tablet; Refill: 1  - UA Macroscopic with reflex to Microscopic and Culture; Future  - TSH; Future  - T4 free; Future  - CBC with Platelets & Differential; Future  - Comprehensive metabolic panel; Future  - Erythrocyte sedimentation rate auto; Future  - CRP inflammation; Future  - Rheumatoid factor; Future  - Cyclic Citrullinated Peptide Antibody IgG; Future  - DNA double stranded antibodies; Future  - HLA-B27 Typing; Future  - Anti Nuclear Jasmine IgG by IFA with Reflex; Future    3. Neuropathic pain    4. Dysuria  - UA Macroscopic with reflex to Microscopic and Culture; Future      Follow-up in 1 month or as needed     The longitudinal plan of care for the diagnosis(es)/condition(s) as documented were addressed during this visit. Due to the added complexity in care, I will continue to support Lydia in the subsequent management and with ongoing continuity of care.    Joelle Adame,   Certified Adult Nurse Practitioner  [phone]      Willis Freeman is a 19 year old, presenting for the following health issues:  Establish Care        3/17/2025     2:46 PM   Additional Questions   Roomed by rosie   Accompanied by mom     HPI      Establish Care     Pain History:  When did you first notice your pain? Nerve    Have you seen this provider for your pain in the past? No   Where in your body do your have pain? Nerve pain Right Side of Torso   Are you seeing anyone else for your pain? Yes - pain and palliative [facility]   What makes your pain better? Medical cannabis   What makes your pain worse? Weather stress illness school   How has pain affected your  "ability to work? 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 3 college roommates         Chronic Pain - Initial Assessment:    How would you describe your pain? Burning sensation   Have you had any recent changes to the severity or character of your pain? No   Is there an underlying cause that has been identified? Syrinx of spinal cord Nerve root disorder  Arnold-Chiari malformation type 1  Has your ability to work or do daily activities changed recently because of your pain? no  Which of these pain treatments have you tried? Acupuncture, Chiropractor, and Other: medications neurological   Previous medication treatments:  Other - Epidurals Injections       She has had extensive work-up and treatment modalities to treat her chronic nerve pain.  Gabapentin, lyrica were not effective.  Has done several types of pain injections.  Is now participating in acupuncture regularly which does offer some relief although short term.     Does not appear to have been tested for autoimmune disorders; has had tick born illnesses assessed.       No lab results found.   BP Readings from Last 3 Encounters:   03/17/25 110/72   12/12/24 119/77   10/30/24 122/83    Wt Readings from Last 3 Encounters:   03/17/25 70.4 kg (155 lb 3.2 oz)   12/12/24 70.3 kg (155 lb)   10/30/24 70.3 kg (155 lb)           Review of Systems  Constitutional, neuro, ENT, endocrine, pulmonary, cardiac, gastrointestinal, genitourinary, musculoskeletal, integument and psychiatric systems are negative, except as otherwise noted.      Objective    /72 (BP Location: Left arm, Patient Position: Chair, Cuff Size: Adult Regular)   Pulse 77   Temp 97.2  F (36.2  C) (Tympanic)   Resp 16   Ht 1.778 m (5' 10\")   Wt 70.4 kg (155 lb 3.2 oz)   LMP 03/09/2025 (Exact Date)   SpO2 100%   BMI 22.27 kg/m    Body mass index is 22.27 kg/m .  Physical Exam   GENERAL: alert and no distress  NECK: no " adenopathy, no asymmetry, masses, or scars  RESP: lungs clear to auscultation - no rales, rhonchi or wheezes  CV: regular rate and rhythm, normal S1 S2, no S3 or S4, no murmur  MS: no gross musculoskeletal defects noted, no edema  PSYCH: mentation appears normal, affect normal/bright            Signed Electronically by: Joelle Adame, NP

## 2025-03-17 ENCOUNTER — OFFICE VISIT (OUTPATIENT)
Dept: FAMILY MEDICINE | Facility: OTHER | Age: 20
End: 2025-03-17
Attending: NURSE PRACTITIONER
Payer: COMMERCIAL

## 2025-03-17 VITALS
RESPIRATION RATE: 16 BRPM | DIASTOLIC BLOOD PRESSURE: 72 MMHG | HEIGHT: 70 IN | HEART RATE: 77 BPM | OXYGEN SATURATION: 100 % | WEIGHT: 155.2 LBS | SYSTOLIC BLOOD PRESSURE: 110 MMHG | BODY MASS INDEX: 22.22 KG/M2 | TEMPERATURE: 97.2 F

## 2025-03-17 DIAGNOSIS — G96.9 DISORDER OF CENTRAL NERVOUS SYSTEM: ICD-10-CM

## 2025-03-17 DIAGNOSIS — M79.2 NEUROPATHIC PAIN: ICD-10-CM

## 2025-03-17 DIAGNOSIS — F41.1 GENERALIZED ANXIETY DISORDER: Primary | ICD-10-CM

## 2025-03-17 DIAGNOSIS — R30.0 DYSURIA: ICD-10-CM

## 2025-03-17 LAB
ALBUMIN UR-MCNC: NEGATIVE MG/DL
APPEARANCE UR: CLEAR
BACTERIA #/AREA URNS HPF: ABNORMAL /HPF
BASOPHILS # BLD AUTO: 0 10E3/UL (ref 0–0.2)
BASOPHILS NFR BLD AUTO: 1 %
BILIRUB UR QL STRIP: NEGATIVE
COLOR UR AUTO: YELLOW
EOSINOPHIL # BLD AUTO: 0.1 10E3/UL (ref 0–0.7)
EOSINOPHIL NFR BLD AUTO: 2 %
ERYTHROCYTE [DISTWIDTH] IN BLOOD BY AUTOMATED COUNT: 11.7 % (ref 10–15)
ERYTHROCYTE [SEDIMENTATION RATE] IN BLOOD BY WESTERGREN METHOD: 9 MM/HR (ref 0–20)
GLUCOSE UR STRIP-MCNC: NEGATIVE MG/DL
HCT VFR BLD AUTO: 43.6 % (ref 35–47)
HGB BLD-MCNC: 15.1 G/DL (ref 11.7–15.7)
HGB UR QL STRIP: NEGATIVE
IMM GRANULOCYTES # BLD: 0 10E3/UL
IMM GRANULOCYTES NFR BLD: 0 %
KETONES UR STRIP-MCNC: NEGATIVE MG/DL
LEUKOCYTE ESTERASE UR QL STRIP: ABNORMAL
LYMPHOCYTES # BLD AUTO: 1.8 10E3/UL (ref 0.8–5.3)
LYMPHOCYTES NFR BLD AUTO: 34 %
MCH RBC QN AUTO: 30.2 PG (ref 26.5–33)
MCHC RBC AUTO-ENTMCNC: 34.6 G/DL (ref 31.5–36.5)
MCV RBC AUTO: 87 FL (ref 78–100)
MONOCYTES # BLD AUTO: 0.5 10E3/UL (ref 0–1.3)
MONOCYTES NFR BLD AUTO: 10 %
NEUTROPHILS # BLD AUTO: 2.9 10E3/UL (ref 1.6–8.3)
NEUTROPHILS NFR BLD AUTO: 53 %
NITRATE UR QL: NEGATIVE
PH UR STRIP: 6 [PH] (ref 5–7)
PLATELET # BLD AUTO: 216 10E3/UL (ref 150–450)
RBC # BLD AUTO: 5 10E6/UL (ref 3.8–5.2)
RBC #/AREA URNS AUTO: ABNORMAL /HPF
SP GR UR STRIP: 1.02 (ref 1–1.03)
SQUAMOUS #/AREA URNS AUTO: ABNORMAL /LPF
UROBILINOGEN UR STRIP-ACNC: 0.2 E.U./DL
WBC # BLD AUTO: 5.4 10E3/UL (ref 4–11)
WBC #/AREA URNS AUTO: ABNORMAL /HPF

## 2025-03-17 RX ORDER — BUSPIRONE HYDROCHLORIDE 10 MG/1
10 TABLET ORAL 2 TIMES DAILY
Qty: 60 TABLET | Refills: 1 | Status: SHIPPED | OUTPATIENT
Start: 2025-03-17

## 2025-03-17 ASSESSMENT — PAIN SCALES - GENERAL: PAINLEVEL_OUTOF10: SEVERE PAIN (8)

## 2025-03-17 NOTE — PATIENT INSTRUCTIONS
Assessment & Plan     1. Generalized anxiety disorder (Primary)  Continue lexapro  - busPIRone (BUSPAR) 10 MG tablet; Take 1 tablet (10 mg) by mouth 2 times daily.  Dispense: 60 tablet; Refill: 1    2. Disorder of central nervous system  - busPIRone (BUSPAR) 10 MG tablet; Take 1 tablet (10 mg) by mouth 2 times daily.  Dispense: 60 tablet; Refill: 1  - UA Macroscopic with reflex to Microscopic and Culture; Future  - TSH; Future  - T4 free; Future  - CBC with Platelets & Differential; Future  - Comprehensive metabolic panel; Future  - Erythrocyte sedimentation rate auto; Future  - CRP inflammation; Future  - Rheumatoid factor; Future  - Cyclic Citrullinated Peptide Antibody IgG; Future  - DNA double stranded antibodies; Future  - HLA-B27 Typing; Future  - Anti Nuclear Jasmine IgG by IFA with Reflex; Future    3. Neuropathic pain    4. Dysuria  - UA Macroscopic with reflex to Microscopic and Culture; Future      Follow-up in 1 month or as needed     Joelle Adame,   Certified Adult Nurse Practitioner  [phone]

## 2025-03-18 LAB
ALBUMIN SERPL BCG-MCNC: 4.5 G/DL (ref 3.5–5.2)
ALP SERPL-CCNC: 72 U/L (ref 40–150)
ALT SERPL W P-5'-P-CCNC: 17 U/L (ref 0–50)
ANION GAP SERPL CALCULATED.3IONS-SCNC: 15 MMOL/L (ref 7–15)
AST SERPL W P-5'-P-CCNC: 23 U/L (ref 0–45)
BILIRUB SERPL-MCNC: 0.3 MG/DL
BUN SERPL-MCNC: 8.5 MG/DL (ref 6–20)
CALCIUM SERPL-MCNC: 9.6 MG/DL (ref 8.8–10.4)
CHLORIDE SERPL-SCNC: 103 MMOL/L (ref 98–107)
CREAT SERPL-MCNC: 0.72 MG/DL (ref 0.51–0.95)
CRP SERPL-MCNC: <3 MG/L
EGFRCR SERPLBLD CKD-EPI 2021: >90 ML/MIN/1.73M2
GLUCOSE SERPL-MCNC: 85 MG/DL (ref 70–99)
HCO3 SERPL-SCNC: 21 MMOL/L (ref 22–29)
POTASSIUM SERPL-SCNC: 3.9 MMOL/L (ref 3.4–5.3)
PROT SERPL-MCNC: 8.1 G/DL (ref 6.4–8.3)
SODIUM SERPL-SCNC: 139 MMOL/L (ref 135–145)
T4 FREE SERPL-MCNC: 1.16 NG/DL (ref 0.9–1.7)
TSH SERPL DL<=0.005 MIU/L-ACNC: 3.13 UIU/ML (ref 0.3–4.2)

## 2025-03-19 LAB
ANA SER QL IF: NEGATIVE
CCP AB SER IA-ACNC: 2.5 U/ML
DSDNA AB SER-ACNC: 1.8 IU/ML
RHEUMATOID FACT SERPL-ACNC: <10 IU/ML

## 2025-03-24 LAB
B LOCUS: NORMAL
B27TEST METHOD: NORMAL

## 2025-07-08 ENCOUNTER — MYC REFILL (OUTPATIENT)
Dept: FAMILY MEDICINE | Facility: OTHER | Age: 20
End: 2025-07-08

## 2025-07-08 DIAGNOSIS — F41.1 GENERALIZED ANXIETY DISORDER: Primary | ICD-10-CM

## 2025-07-09 RX ORDER — ESCITALOPRAM OXALATE 20 MG/1
20 TABLET ORAL DAILY
Qty: 90 TABLET | Refills: 1 | Status: SHIPPED | OUTPATIENT
Start: 2025-07-09

## 2025-07-09 NOTE — TELEPHONE ENCOUNTER
Escitalopram (Lexapro) 20 MG tablet    Last Written Prescription Date:  07/09/2025  Last Fill Quantity: 90,   # refills: 1  Last Office Visit: 03/17/2025  Future Office visit:       Routing refill request to provider for review/approval because:  Protocol failed on the Lexapro.

## 2025-07-09 NOTE — TELEPHONE ENCOUNTER
SSRIs Protocol [id number]/2025 05:52 PM   Protocol Details Medication indicated for associated diagnosis

## (undated) RX ORDER — SULFAMETHOXAZOLE AND TRIMETHOPRIM 800; 160 MG/1; MG/1
TABLET ORAL
Status: DISPENSED
Start: 2024-10-30